# Patient Record
Sex: MALE | Race: OTHER
[De-identification: names, ages, dates, MRNs, and addresses within clinical notes are randomized per-mention and may not be internally consistent; named-entity substitution may affect disease eponyms.]

---

## 2018-01-14 ENCOUNTER — HOSPITAL ENCOUNTER (EMERGENCY)
Dept: HOSPITAL 72 - EMR | Age: 28
Discharge: HOME | End: 2018-01-14
Payer: SELF-PAY

## 2018-01-14 VITALS — WEIGHT: 151 LBS | BODY MASS INDEX: 25.78 KG/M2 | HEIGHT: 64 IN

## 2018-01-14 VITALS — DIASTOLIC BLOOD PRESSURE: 82 MMHG | SYSTOLIC BLOOD PRESSURE: 135 MMHG

## 2018-01-14 VITALS — DIASTOLIC BLOOD PRESSURE: 77 MMHG | SYSTOLIC BLOOD PRESSURE: 100 MMHG

## 2018-01-14 VITALS — DIASTOLIC BLOOD PRESSURE: 77 MMHG | SYSTOLIC BLOOD PRESSURE: 120 MMHG

## 2018-01-14 DIAGNOSIS — F12.929: Primary | ICD-10-CM

## 2018-01-14 DIAGNOSIS — R00.2: ICD-10-CM

## 2018-01-14 DIAGNOSIS — R07.9: ICD-10-CM

## 2018-01-14 DIAGNOSIS — J45.909: ICD-10-CM

## 2018-01-14 LAB
ADD MANUAL DIFF: NO
ALBUMIN SERPL-MCNC: 4.4 G/DL (ref 3.4–5)
ALBUMIN/GLOB SERPL: 1.4 {RATIO} (ref 1–2.7)
ALP SERPL-CCNC: 85 U/L (ref 46–116)
ALT SERPL-CCNC: 25 U/L (ref 12–78)
ANION GAP SERPL CALC-SCNC: 15 MMOL/L (ref 5–15)
AST SERPL-CCNC: 21 U/L (ref 15–37)
BASOPHILS NFR BLD AUTO: 0.9 % (ref 0–2)
BILIRUB SERPL-MCNC: 0.3 MG/DL (ref 0.2–1)
BUN SERPL-MCNC: 11 MG/DL (ref 7–18)
CALCIUM SERPL-MCNC: 9 MG/DL (ref 8.5–10.1)
CHLORIDE SERPL-SCNC: 103 MMOL/L (ref 98–107)
CO2 SERPL-SCNC: 25 MMOL/L (ref 21–32)
CREAT SERPL-MCNC: 0.9 MG/DL (ref 0.55–1.3)
EOSINOPHIL NFR BLD AUTO: 3.7 % (ref 0–3)
ERYTHROCYTE [DISTWIDTH] IN BLOOD BY AUTOMATED COUNT: 10.9 % (ref 11.6–14.8)
GLOBULIN SER-MCNC: 3.1 G/DL
HCT VFR BLD CALC: 45.9 % (ref 42–52)
HGB BLD-MCNC: 14.8 G/DL (ref 14.2–18)
LYMPHOCYTES NFR BLD AUTO: 29 % (ref 20–45)
MCV RBC AUTO: 96 FL (ref 80–99)
MONOCYTES NFR BLD AUTO: 4.5 % (ref 1–10)
NEUTROPHILS NFR BLD AUTO: 61.9 % (ref 45–75)
PLATELET # BLD: 210 K/UL (ref 150–450)
POTASSIUM SERPL-SCNC: 3.2 MMOL/L (ref 3.5–5.1)
RBC # BLD AUTO: 4.78 M/UL (ref 4.7–6.1)
SODIUM SERPL-SCNC: 142 MMOL/L (ref 136–145)
WBC # BLD AUTO: 10 K/UL (ref 4.8–10.8)

## 2018-01-14 PROCEDURE — 96374 THER/PROPH/DIAG INJ IV PUSH: CPT

## 2018-01-14 PROCEDURE — 71045 X-RAY EXAM CHEST 1 VIEW: CPT

## 2018-01-14 PROCEDURE — 36415 COLL VENOUS BLD VENIPUNCTURE: CPT

## 2018-01-14 PROCEDURE — 80329 ANALGESICS NON-OPIOID 1 OR 2: CPT

## 2018-01-14 PROCEDURE — 96361 HYDRATE IV INFUSION ADD-ON: CPT

## 2018-01-14 PROCEDURE — 80053 COMPREHEN METABOLIC PANEL: CPT

## 2018-01-14 PROCEDURE — 93005 ELECTROCARDIOGRAM TRACING: CPT

## 2018-01-14 PROCEDURE — 99284 EMERGENCY DEPT VISIT MOD MDM: CPT

## 2018-01-14 PROCEDURE — 85025 COMPLETE CBC W/AUTO DIFF WBC: CPT

## 2018-01-14 PROCEDURE — 80307 DRUG TEST PRSMV CHEM ANLYZR: CPT

## 2018-01-14 NOTE — EMERGENCY ROOM REPORT
History of Present Illness


General


Chief Complaint:  Substance Abuse


Source:  Patient





Present Illness


HPI


19-year-old male, no significant past medical history, presenting with 

palpitations, chest and arm numbness, for 2 days.  Patient states he smoked 

marijuana, had the numbness in the chest pain yesterday.  States it went away 

and then came back today after smoking again.  Denies any other drug use.  No 

fever chills no cough.  Has not history of any blood clots.  No other drug use


Allergies:  


Coded Allergies:  


     No Known Allergies (Unverified , 1/14/18)





Patient History


Past Medical History:  see triage record


Past Surgical History:  none


Pertinent Family History:  none


Reviewed Nursing Documentation:  PMH: Agreed, PSxH: Agreed





Nursing Documentation-PMH


Hx Asthma:  Yes





Review of Systems


All Other Systems:  negative except mentioned in HPI





Physical Exam





Vital Signs








  Date Time  Temp Pulse Resp B/P (MAP) Pulse Ox O2 Delivery O2 Flow Rate FiO2


 


1/14/18 19:38 97.5 116 18 141/87 100 Room Air  








Sp02 EP Interpretation:  reviewed, normal


General Appearance:  alert, GCS 15, non-toxic, mild distress


Head:  normocephalic, atraumatic


Eyes:  bilateral eye normal inspection, bilateral eye PERRL, bilateral eye EOMI


ENT:  normal ENT inspection, normal pharynx, normal voice, moist mucus membranes


Neck:  normal inspection, full range of motion, supple


Respiratory:  normal inspection, lungs clear, normal breath sounds, no 

respiratory distress, no retraction, no wheezing, speaking full sentences, 

chest symmetrical


Cardiovascular #1:  normal peripheral pulses, tachycardia


Cardiovascular #2:  2+ radial (R), 2+ radial (L)


Gastrointestinal:  normal inspection, non tender, soft, non-distended, no 

guarding


Genitourinary:  no CVA tenderness


Musculoskeletal:  normal inspection, back normal, normal range of motion, non-

tender


Neurologic:  normal inspection, alert, oriented x3, responsive, CNs III-XII nml 

as tested, motor strength/tone normal, sensory intact, normal gait, speech 

normal


Psychiatric:  normal inspection, judgement/insight normal, memory normal


Skin:  normal inspection, normal color, no rash, warm/dry, well hydrated, 

normal turgor





Medical Decision Making


Diagnostic Impression:  


 Primary Impression:  


 Marijuana intoxication


 Additional Impression:  


 Chest pain


ER Course


27-year-old male with intermittent numbness and chest pain after doing marijuana





DDX:


Likely marijuana related.


Very low concern for ACS given history and young age





Plan:


Obtain labs, CXR, EKG





ER course:


Given Ativan for anxiety


Feeling much better.  Remains a no x4 neurologically intact


instructed to refrain from drug use





Disposition:


Patient is to be discharged to home.


Patient is instructed to follow up with their primary care doctor within 5 

days. 


Strict return precautions discussed with patient such as fever, chills, 

worsening/severe pain, chest pain, SOB, nausea, vomiting, which may indicate 

severe illness. Patient verbalizes understanding and agrees with plan. 





Please note that this Emergency Department Report was dictated using [employer] technology software, occasionally this can lead to 

erroneous entry secondary to interpretation by the dictation equipment





EKG Diagnostic Results


EP Interpretation: Yes


Rate: Tachycardic


Rhythm: NSR


ST Segments: No acute changes 


ASA given to patient: No





Rhythm Strip


EP Interpretation: Yes


Rate: 110


Rhythm: NSR, no PVCs, no ectopy





Chest X-ray


CXR: Ordered: Yes


1 view


Indication: Chest pain


EP interpretation: Yes


Interpretation: No consolidation, no effusion, no PTX, no acute cardiopulmonary 

disease


Impression: No acute disease





Electronically signed by Rupinder Saxena MD





Laboratory Tests








Test


  1/14/18


19:45 1/14/18


19:55


 


White Blood Count


  10.0 K/UL


(4.8-10.8) 


 


 


Red Blood Count


  4.78 M/UL


(4.70-6.10) 


 


 


Hemoglobin


  14.8 G/DL


(14.2-18.0) 


 


 


Hematocrit


  45.9 %


(42.0-52.0) 


 


 


Mean Corpuscular Volume 96 FL (80-99)   


 


Mean Corpuscular Hemoglobin


  30.9 PG


(27.0-31.0) 


 


 


Mean Corpuscular Hemoglobin


Concent 32.1 G/DL


(32.0-36.0) 


 


 


Red Cell Distribution Width


  10.9 %


(11.6-14.8)  L 


 


 


Platelet Count


  210 K/UL


(150-450) 


 


 


Mean Platelet Volume


  7.1 FL


(6.5-10.1) 


 


 


Neutrophils (%) (Auto)


  61.9 %


(45.0-75.0) 


 


 


Lymphocytes (%) (Auto)


  29.0 %


(20.0-45.0) 


 


 


Monocytes (%) (Auto)


  4.5 %


(1.0-10.0) 


 


 


Eosinophils (%) (Auto)


  3.7 %


(0.0-3.0)  H 


 


 


Basophils (%) (Auto)


  0.9 %


(0.0-2.0) 


 


 


Sodium Level


  142 MMOL/L


(136-145) 


 


 


Potassium Level


  3.2 MMOL/L


(3.5-5.1)  L 


 


 


Chloride Level


  103 MMOL/L


() 


 


 


Carbon Dioxide Level


  25 MMOL/L


(21-32) 


 


 


Anion Gap


  15 mmol/L


(5-15) 


 


 


Blood Urea Nitrogen


  11 mg/dL


(7-18) 


 


 


Creatinine


  0.9 MG/DL


(0.55-1.30) 


 


 


Estimate Glomerular


Filtration Rate > 60 mL/min


(>60) 


 


 


Glucose Level


  134 MG/DL


()  H 


 


 


Calcium Level


  9.0 MG/DL


(8.5-10.1) 


 


 


Total Bilirubin


  0.3 MG/DL


(0.2-1.0) 


 


 


Aspartate Amino Transferase


(AST) 21 U/L (15-37)


  


 


 


Alanine Aminotransferase (ALT)


  25 U/L (12-78)


  


 


 


Alkaline Phosphatase


  85 U/L


() 


 


 


Total Protein


  7.5 G/DL


(6.4-8.2) 


 


 


Albumin


  4.4 G/DL


(3.4-5.0) 


 


 


Globulin 3.1 g/dL   


 


Albumin/Globulin Ratio 1.4 (1.0-2.7)   


 


Salicylates Level


  2.6 ug/mL


(2.8-20)  L 


 


 


Acetaminophen Level


  < 2 MCG/ML


(10-30)  L 


 


 


Serum Alcohol < 3 mg/dL   


 


Urine Opiates Screen


  


  Negative


(NEGATIVE)


 


Urine Barbiturates Screen


  


  Negative


(NEGATIVE)


 


Phencyclidine (PCP) Screen


  


  Negative


(NEGATIVE)


 


Urine Amphetamines Screen


  


  Negative


(NEGATIVE)


 


Urine Benzodiazepines Screen


  


  Negative


(NEGATIVE)


 


Urine Cocaine Screen


  


  Negative


(NEGATIVE)


 


Urine Marijuana (THC) Screen


  


  Positive


(NEGATIVE)  H











Last Vital Signs








  Date Time  Temp Pulse Resp B/P (MAP) Pulse Ox O2 Delivery O2 Flow Rate FiO2


 


1/14/18 19:38 97.5 116 18 141/87 100 Room Air  








Disposition:  HOME, SELF-CARE


Condition:  Improved











Rupinder Saxena M.D. Jan 14, 2018 19:55

## 2018-01-15 NOTE — CARDIOLOGY REPORT
--------------- APPROVED REPORT --------------





EKG Measurement

Heart [id number]

[id number]

[id number]

[id number]

[id number]





Sinus tachycardia

Otherwise normal ECG

## 2018-01-15 NOTE — DIAGNOSTIC IMAGING REPORT
Indication: An

 

Technique: XRAY Chest 1v

 

Comparison: None

 

Findings: Heart size and mediastinal contours are within normal limits given

technique. There is no focal consolidation, pneumothorax or pleural effusion. Osseous

structures demonstrate no acute abnormality. Abdominal shield partially visualized.

 

Impression: No radiographic evidence of acute cardiopulmonary disease.